# Patient Record
Sex: FEMALE | Race: WHITE
[De-identification: names, ages, dates, MRNs, and addresses within clinical notes are randomized per-mention and may not be internally consistent; named-entity substitution may affect disease eponyms.]

---

## 2018-08-14 ENCOUNTER — HOSPITAL ENCOUNTER (OUTPATIENT)
Dept: HOSPITAL 47 - RADMAMWWP | Age: 63
Discharge: HOME | End: 2018-08-14
Payer: COMMERCIAL

## 2018-08-14 DIAGNOSIS — Z12.31: Primary | ICD-10-CM

## 2018-08-14 PROCEDURE — 77067 SCR MAMMO BI INCL CAD: CPT

## 2018-08-20 NOTE — MM
Reason for exam: screening  (asymptomatic).

Last mammogram was performed 2 years and 1 month ago.



History:

Patient is postmenopausal.

Family history of breast cancer in aunt at age 35.

Benign excisional biopsy of the left breast.



Physical Findings:

A clinical breast exam by your physician is recommended on an annual basis and 

results should be correlated with mammographic findings.



MG Screening Mammo w CAD

Bilateral CC and MLO view(s) were taken.

Prior study comparison: July 13, 2016, bilateral MG screening mammo w CAD.  July 16, 2014, right breast MG diagnostic mammo RT w CAD.

The breast tissue is almost entirely fat.  There is chronic nodularity 

bilaterally.  No significant changes when compared with prior studies.





ASSESSMENT: Benign, BI-RAD 2



RECOMMENDATION:

Routine screening mammogram of both breasts in 1 year.

## 2021-01-28 ENCOUNTER — HOSPITAL ENCOUNTER (OUTPATIENT)
Dept: HOSPITAL 47 - LABWHC1 | Age: 66
Discharge: HOME | End: 2021-01-28
Attending: ORTHOPAEDIC SURGERY
Payer: COMMERCIAL

## 2021-01-28 DIAGNOSIS — M43.16: Primary | ICD-10-CM

## 2021-01-28 LAB
ANION GAP SERPL CALC-SCNC: 11.2 MMOL/L (ref 4–12)
APTT BLD: 24.1 SEC (ref 23.5–31)
BUN SERPL-SCNC: 17 MG/DL (ref 9–27)
BUN/CREAT SERPL: 21.25 RATIO (ref 12–20)
CALCIUM SPEC-MCNC: 9.8 MG/DL (ref 8.7–10.3)
CHLORIDE SERPL-SCNC: 102 MMOL/L (ref 96–109)
CO2 SERPL-SCNC: 27.8 MMOL/L (ref 21.6–31.8)
ERYTHROCYTE [DISTWIDTH] IN BLOOD BY AUTOMATED COUNT: 4.82 M/UL (ref 3.8–5.4)
ERYTHROCYTE [DISTWIDTH] IN BLOOD: 12.5 % (ref 11.5–15.5)
GLUCOSE SERPL-MCNC: 180 MG/DL (ref 70–110)
HCT VFR BLD AUTO: 45.7 % (ref 34–46)
HGB BLD-MCNC: 15.9 GM/DL (ref 11.4–16)
INR PPP: 0.97 (ref 0.9–1.11)
MCH RBC QN AUTO: 33 PG (ref 25–35)
MCHC RBC AUTO-ENTMCNC: 34.8 G/DL (ref 31–37)
MCV RBC AUTO: 94.8 FL (ref 80–100)
PH UR: 5.5 [PH] (ref 5–8)
PLATELET # BLD AUTO: 314 K/UL (ref 150–450)
POTASSIUM SERPL-SCNC: 3.5 MMOL/L (ref 3.5–5.5)
PT BLD: 10.6 SEC (ref 9.9–11.9)
SODIUM SERPL-SCNC: 141 MMOL/L (ref 135–145)
SP GR UR: 1.01 (ref 1–1.03)
UROBILINOGEN UR QL STRIP: <2 MG/DL (ref ?–2)
WBC # BLD AUTO: 10.8 K/UL (ref 3.8–10.6)

## 2021-01-28 PROCEDURE — 81003 URINALYSIS AUTO W/O SCOPE: CPT

## 2021-01-28 PROCEDURE — 36415 COLL VENOUS BLD VENIPUNCTURE: CPT

## 2021-01-28 PROCEDURE — 71046 X-RAY EXAM CHEST 2 VIEWS: CPT

## 2021-01-28 PROCEDURE — 85610 PROTHROMBIN TIME: CPT

## 2021-01-28 PROCEDURE — 85027 COMPLETE CBC AUTOMATED: CPT

## 2021-01-28 PROCEDURE — 93005 ELECTROCARDIOGRAM TRACING: CPT

## 2021-01-28 PROCEDURE — 85730 THROMBOPLASTIN TIME PARTIAL: CPT

## 2021-01-28 PROCEDURE — 80048 BASIC METABOLIC PNL TOTAL CA: CPT

## 2021-01-28 NOTE — XR
EXAMINATION TYPE: XR chest 2V

 

DATE OF EXAM: 1/28/2021

 

COMPARISON: Prior chest x-ray 10/21/2013

 

HISTORY: Spondylolisthesis, preop

 

TECHNIQUE:  Frontal and lateral views of the chest are obtained.

 

FINDINGS:  There is no focal air space opacity, pleural effusion, or pneumothorax seen.  The cardiac 
silhouette size is within normal limits.   The patient is rotated. Lung volumes are improved compared
 to prior exam. Surgical clips present in the upper abdomen. The osseous structures are intact.

 

IMPRESSION:  No acute cardiopulmonary process.

## 2021-05-24 ENCOUNTER — HOSPITAL ENCOUNTER (OUTPATIENT)
Dept: HOSPITAL 47 - RADMAMWWP | Age: 66
Discharge: HOME | End: 2021-05-24
Attending: FAMILY MEDICINE
Payer: COMMERCIAL

## 2021-05-24 DIAGNOSIS — Z12.31: Primary | ICD-10-CM

## 2021-05-24 DIAGNOSIS — Z78.0: ICD-10-CM

## 2021-05-24 DIAGNOSIS — Z80.3: ICD-10-CM

## 2021-05-24 PROCEDURE — 77067 SCR MAMMO BI INCL CAD: CPT

## 2021-05-26 NOTE — MM
Reason for exam: screening  (asymptomatic).

Last mammogram was performed 2 years and 9 months ago.



History:

Patient is postmenopausal.

Family history of breast cancer in aunt at age 35.

Benign excisional biopsy of the left breast.



Physical Findings:

A clinical breast exam by your physician is recommended on an annual basis and 

results should be correlated with mammographic findings.



MG Screening Mammo w CAD

Bilateral CC, MLO, and XCCL view(s) were taken.

Prior study comparison: August 14, 2018, bilateral MG screening mammo w CAD.  July 13, 2016, bilateral MG screening mammo w CAD.

There are scattered fibroglandular densities.  Left axillary lymph nodes likely 

reactive.





ASSESSMENT: Benign, BI-RAD 2



RECOMMENDATION:

Routine screening mammogram of both breasts in 1 year.

## 2022-06-21 ENCOUNTER — HOSPITAL ENCOUNTER (OUTPATIENT)
Dept: HOSPITAL 47 - RADMAMWWP | Age: 67
Discharge: HOME | End: 2022-06-21
Attending: FAMILY MEDICINE
Payer: COMMERCIAL

## 2022-06-21 DIAGNOSIS — Z80.3: ICD-10-CM

## 2022-06-21 DIAGNOSIS — Z78.0: ICD-10-CM

## 2022-06-21 DIAGNOSIS — Z12.31: Primary | ICD-10-CM

## 2022-06-21 PROCEDURE — 77067 SCR MAMMO BI INCL CAD: CPT

## 2022-06-22 NOTE — MM
Reason for Exam: Screening  (asymptomatic). 

Last mammogram was performed 1 year(s) and 1 month(s) ago. 





Patient History: 

Menarche at age 18. First Full-Term Pregnancy at age 22. Postmenopausal. Benign Excisional Biopsy on

the left side.

Maternal aunt had breast cancer, age 35. 





Risk Values: 

Jo 5 year model risk: 1.6%.

NCI Lifetime model risk: 6.3%.





Prior Study Comparison: 

7/13/2016 Bilateral Screening Mammogram, [facility]. 8/14/2018 Bilateral Screening Mammogram, [facility].

5/24/2021 Bilateral Screening Mammogram, [facility]. 





Tissue Density: 

The breast tissue is almost entirely fat.





Findings: 

Analyzed By CAD. 

Chronic underlying nodularity is present. No individual nodules more suspicious for malignancy than

another.

No suspicious groups of microcalcifications, spiculated or lobular masses, architectural distortion

or other secondary signs of malignancy are mammographically apparent. 





Overall Assessment: Benign, BI-RAD 2





Management: 

Screening Mammogram of both breasts in 1 year.

A negative mammogram report should not preclude additional follow up of suspicious palpable

abnormalities.

Patient should continue monthly self breast exam.

A clinical breast exam by your physician is recommended on an annual basis and results should be

correlated with mammographic findings.



Electronically signed and approved by: Tarik Bradford D.O. Radiologis

## 2023-09-14 ENCOUNTER — HOSPITAL ENCOUNTER (OUTPATIENT)
Dept: HOSPITAL 47 - RADMAMWWP | Age: 68
Discharge: HOME | End: 2023-09-14
Attending: FAMILY MEDICINE
Payer: COMMERCIAL

## 2023-09-14 DIAGNOSIS — M85.89: ICD-10-CM

## 2023-09-14 DIAGNOSIS — Z80.3: ICD-10-CM

## 2023-09-14 DIAGNOSIS — Z78.0: ICD-10-CM

## 2023-09-14 DIAGNOSIS — Z12.31: Primary | ICD-10-CM

## 2023-09-14 PROCEDURE — 77067 SCR MAMMO BI INCL CAD: CPT

## 2023-09-14 PROCEDURE — 77080 DXA BONE DENSITY AXIAL: CPT

## 2023-09-14 NOTE — BD
EXAMINATION TYPE: Axial Bone Density

 

DATE OF EXAM: 9/14/2023

 

CLINICAL HISTORY: 68 years old Female.  ICD-10 CODE: asymptomatic menopaus

 

Height:  63.25in

Weight:  226lb

 

FRAX RISK QUESTIONS:

 

Secondary Osteoporosis:

    3.  Menopause before 45: 52

 

RISK FACTORS 

HISTORY OF: 

Surgery to Spine/Hip(right/left)/Wrist (right/left): lumbar disc surgery

When: 2021

Active: yes

Postmenopausal woman: yes

 

MEDICATIONS: 

Additional Medications: diabetic meds, bp meds, calcium with vitamin d 

 

 

Additional History: Type II diabetic

 

 

EXAM MEASUREMENTS: 

Bone mineral densitometry was performed using the [employer] System.

Bone mineral density as measured about the Lumbar spine is:  

----- L1-L4(G/cm2): 1.031

T Score Values are as follows:

----- L1: -1.3

----- L2: -1.1

----- L3: -1.4

----- L4: -1.3

----- L1-L4: -1.2

 

Z Score Values are as follows:

----- L1: -0.8

----- L2: -0.7

----- L3: -1.0

----- L4: -0.8

----- L1-L4: -0.8

 

First dexa at [facility]

 

Bone mineral density about the R hip (g/cm2): 0.790

Bone mineral density about the L hip (g/cm2): 0.831

T Score values are as follows:

-----R Neck: -1.9

-----L Neck: -1.6

-----R Total: -1.7

-----L Total: -1.4

 

Z Score values are as follows:

-----R Neck: -1.1

-----L Neck: -0.9

-----R Total: -1.3

-----L Total: -1.0

 

 

FRAX%s: The graph provided illustrates a 9.3% chance for a major osteoporotic fx and a 1.2% chance fo
r the hips probability for fx in 10 years time.

 

 

 

 

IMPRESSION:

Osteopenia (T Score between -2.5 and -1).

 

There is slightly increased risk of fracture and the patient may be considered 

for treatment. 

 

Re-Screen 2-5 years.

 

NOTE:  T-SCORE=SD OF THE YOUNG ADULT MEAN.

## 2023-09-15 NOTE — MM
Reason for Exam: Screening  (asymptomatic). 

Last mammogram was performed 1 year(s) and 3 month(s) ago. 





Patient History: 

Menarche at age 18. First Full-Term Pregnancy at age 22. Postmenopausal. Benign Excisional Biopsy on

the left side.

Maternal aunt had breast cancer, age 35. 





Risk Values: 

Jo 5 year model risk: 1.6%.

NCI Lifetime model risk: 5.8%.





Prior Study Comparison: 

8/14/2018 Bilateral Screening Mammogram, [facility]. 5/24/2021 Bilateral Screening Mammogram, [facility].

6/21/2022 Bilateral MG screening mammo w CAD, [facility]. 





Tissue Density: 

The breast tissue is almost entirely fat.





Findings: 

Analyzed By CAD. 

Chronic bilateral nodularity.

There is no suspicious group of microcalcifications or new suspicious mass in either breast. 





Overall Assessment: Benign, BI-RAD 2





Management: 

Screening Mammogram of both breasts in 1 year.

.



Patient should continue monthly self-breast exams.  A clinical breast exam by your physician is

recommended on an annual basis.

This exam should not preclude additional follow-up of suspicious palpable abnormalities.



Note on Jo scores and lifetime risk:

1. A Jo score greater than 3% is considered moderate risk. If this is the case, consider

specialist referral to assess eligibility for a risk reducing agent.

2. If overall lifetime risk for the development of breast cancer is 20% or higher, the patient may

qualify for future screening with alternating mammogram and breast MRI.



Electronically signed and approved by: Natalie Madison M.D. Radiologist

## 2025-04-07 ENCOUNTER — HOSPITAL ENCOUNTER (OUTPATIENT)
Dept: HOSPITAL 47 - RADMAMWWP | Age: 70
Discharge: HOME | End: 2025-04-07
Attending: FAMILY MEDICINE
Payer: COMMERCIAL

## 2025-04-07 DIAGNOSIS — Z12.31: Primary | ICD-10-CM

## 2025-04-07 DIAGNOSIS — R92.323: ICD-10-CM

## 2025-04-07 DIAGNOSIS — Z78.0: ICD-10-CM

## 2025-04-07 DIAGNOSIS — Z80.3: ICD-10-CM

## 2025-04-07 PROCEDURE — 77067 SCR MAMMO BI INCL CAD: CPT
